# Patient Record
Sex: FEMALE | URBAN - METROPOLITAN AREA
[De-identification: names, ages, dates, MRNs, and addresses within clinical notes are randomized per-mention and may not be internally consistent; named-entity substitution may affect disease eponyms.]

---

## 2019-03-28 NOTE — PATIENT DISCUSSION
"""Pt falls when not wearing glassses.  Rec pt to wear glasses all the time due to anisometropia. """

## 2019-05-15 ENCOUNTER — IMPORTED ENCOUNTER (OUTPATIENT)
Dept: URBAN - METROPOLITAN AREA CLINIC 9 | Facility: CLINIC | Age: 34
End: 2019-05-15

## 2019-06-04 ENCOUNTER — IMPORTED ENCOUNTER (OUTPATIENT)
Dept: URBAN - METROPOLITAN AREA CLINIC 9 | Facility: CLINIC | Age: 34
End: 2019-06-04

## 2020-07-06 ENCOUNTER — IMPORTED ENCOUNTER (OUTPATIENT)
Dept: URBAN - METROPOLITAN AREA CLINIC 9 | Facility: CLINIC | Age: 35
End: 2020-07-06

## 2020-07-22 ENCOUNTER — IMPORTED ENCOUNTER (OUTPATIENT)
Dept: URBAN - METROPOLITAN AREA CLINIC 9 | Facility: CLINIC | Age: 35
End: 2020-07-22

## 2021-08-13 NOTE — PATIENT DISCUSSION
CATARACT SURGERY PLANNER - STANDARD IOL/+FEMTO: Phacoemulsification with IOL: Eye: left|DOS: 8/18/21|Model: DIB00|Power: 20(ORA)|Femto: yes|Arcs: 36 @ 177 ; 36 @ 357|Visc: duet|[place]: yes|10% Phenylephrine: no|Epi-shugarcaine: yees|Phaco Setting: dense|BSS+: no|Trypan Blue: no|CTR: no|Olive Tip: no|Atropine: no|Pupilloplasty: no|Notes: HPRK.

## 2021-10-16 ASSESSMENT — TONOMETRY
OS_IOP_MMHG: 18
OS_IOP_MMHG: 12
OS_IOP_MMHG: 14
OS_IOP_MMHG: 13
OD_IOP_MMHG: 15
OD_IOP_MMHG: 12
OD_IOP_MMHG: 13
OD_IOP_MMHG: 12

## 2021-10-16 ASSESSMENT — VISUAL ACUITY
OD_CC: 20/20 SN
OD_CC: 20/20 -2 SN
OS_CC: 20/25 SN
OD_CC: 20/20 SN
OD_CC: 20/25 - SN
OS_CC: 20/25 -2 SN
OS_CC: 20/25 -2 SN
OS_CC: 20/20 SN

## 2022-07-01 RX ORDER — METOPROLOL SUCCINATE 25 MG/1
TABLET, EXTENDED RELEASE ORAL
COMMUNITY